# Patient Record
Sex: FEMALE | Race: WHITE | NOT HISPANIC OR LATINO | Employment: FULL TIME | URBAN - METROPOLITAN AREA
[De-identification: names, ages, dates, MRNs, and addresses within clinical notes are randomized per-mention and may not be internally consistent; named-entity substitution may affect disease eponyms.]

---

## 2020-12-14 ENCOUNTER — TREATMENT (OUTPATIENT)
Dept: PHYSICAL THERAPY | Facility: CLINIC | Age: 50
End: 2020-12-14

## 2020-12-14 DIAGNOSIS — M20.012 MALLET FINGER OF LEFT HAND: Primary | ICD-10-CM

## 2020-12-14 PROCEDURE — L3933 FO W/O JOINTS CF: HCPCS | Performed by: PHYSICAL THERAPIST

## 2020-12-15 ENCOUNTER — TRANSCRIBE ORDERS (OUTPATIENT)
Dept: PHYSICAL THERAPY | Facility: CLINIC | Age: 50
End: 2020-12-15

## 2020-12-15 DIAGNOSIS — M20.012 MALLET FINGER OF LEFT HAND: Primary | ICD-10-CM

## 2020-12-15 NOTE — PROGRESS NOTES
Paula Headley 1970   Diagnosis/ Surgery: Left Small finger Mallet deformity              Date Of Onset: August 2020    Date Of Surgery:N/A    Hand Dominance: Right  History of Present Condition: Work related injury. Working for the [employer] as a .  Lifting a moderately heavy box, may jammed her fingers, developed a mallet deformity.  Used a splint for 6 weeks, then stop using it until she could see a hand specialist.  When she took the initial splint off her P3 would drop.  Medical/Vocational History/ Medications: PMH unremarkable.    Pain: None    Edema: DIP joint  Sensibility: WNL   Wound Status:N/A  ROM/ Strength/Test: 70 degree mallet deformity, and small finger swan neck deformity.    Splinting:  · Patient was measure and fit with a custom fabricated volar/dorsal bivalve DIP extension splint, placed into hyperextension as much as joint would allow.  Due to small size of finger and the swan neck deformity that has developed, it was difficult to get her finger into appropriate position for the mallet finger.  She may have to go to a full length small finger splint.   · Patient was instructed in wearing schedule, precautions and care of the splint during this visit.   · Patient was instructed in proper donning/doffing of splint.   Assessment:  · Patient was fitted and appropriate splint was fabricated this date.  · Patient reported that splint was comfortable and had no complications with the fit of the splint.  · Patient was instructed and patient verbalized understanding of precautions, wear and care of the splint.   · Patient demonstrated independent donning/doffing of splint during treatment today.  Goals:  · Patient was fitted properly with appropriate splint for diagnosis  · Patient was educated on precautions, wear schedule and care of splint  · Patient demonstrated independence with donning/doffing of the splint.  · Splint was provided to Protect Healing Structures,  Restrict Mobility, Improve joint alignment.  Plan:  · No additional treatment is required for this patient at this time. The patient is therefore discharged from therapy.  · Patient advised to contact therapist with any additional questions or concerns regarding the fit and function of the splint.  · Patient will be seen for splint issues as needed   · Wear Instructions: Off for hygiene       PT SIGNATURE: Jadon Juarez PT, CHT  DATE TREATMENT INITIATED: 12/14/2020